# Patient Record
Sex: MALE | Race: WHITE | NOT HISPANIC OR LATINO | URBAN - METROPOLITAN AREA
[De-identification: names, ages, dates, MRNs, and addresses within clinical notes are randomized per-mention and may not be internally consistent; named-entity substitution may affect disease eponyms.]

---

## 2017-09-03 ENCOUNTER — INPATIENT (INPATIENT)
Facility: HOSPITAL | Age: 77
LOS: 0 days | Discharge: HOME | DRG: 694 | End: 2017-09-04
Attending: UROLOGY | Admitting: UROLOGY
Payer: MEDICARE

## 2017-09-03 VITALS
HEART RATE: 65 BPM | OXYGEN SATURATION: 97 % | HEIGHT: 71 IN | SYSTOLIC BLOOD PRESSURE: 179 MMHG | RESPIRATION RATE: 18 BRPM | DIASTOLIC BLOOD PRESSURE: 82 MMHG | TEMPERATURE: 98 F | WEIGHT: 190.04 LBS

## 2017-09-03 DIAGNOSIS — Z85.828 PERSONAL HISTORY OF OTHER MALIGNANT NEOPLASM OF SKIN: Chronic | ICD-10-CM

## 2017-09-03 DIAGNOSIS — Z90.89 ACQUIRED ABSENCE OF OTHER ORGANS: Chronic | ICD-10-CM

## 2017-09-03 LAB
ALBUMIN SERPL ELPH-MCNC: 4.9 G/DL — SIGNIFICANT CHANGE UP (ref 3.3–5)
ALP SERPL-CCNC: 65 U/L — SIGNIFICANT CHANGE UP (ref 40–120)
ALT FLD-CCNC: 25 U/L — SIGNIFICANT CHANGE UP (ref 10–45)
ANION GAP SERPL CALC-SCNC: 19 MMOL/L — HIGH (ref 5–17)
APPEARANCE UR: CLEAR — SIGNIFICANT CHANGE UP
APTT BLD: 27.4 SEC — LOW (ref 27.5–37.4)
AST SERPL-CCNC: 26 U/L — SIGNIFICANT CHANGE UP (ref 10–40)
BASOPHILS NFR BLD AUTO: 0.1 % — SIGNIFICANT CHANGE UP (ref 0–2)
BILIRUB SERPL-MCNC: 0.7 MG/DL — SIGNIFICANT CHANGE UP (ref 0.2–1.2)
BILIRUB UR-MCNC: NEGATIVE — SIGNIFICANT CHANGE UP
BUN SERPL-MCNC: 26 MG/DL — HIGH (ref 7–23)
CALCIUM SERPL-MCNC: 9.8 MG/DL — SIGNIFICANT CHANGE UP (ref 8.4–10.5)
CHLORIDE SERPL-SCNC: 93 MMOL/L — LOW (ref 96–108)
CO2 SERPL-SCNC: 25 MMOL/L — SIGNIFICANT CHANGE UP (ref 22–31)
COLOR SPEC: YELLOW — SIGNIFICANT CHANGE UP
CREAT SERPL-MCNC: 1 MG/DL — SIGNIFICANT CHANGE UP (ref 0.5–1.3)
DIFF PNL FLD: (no result)
EOSINOPHIL NFR BLD AUTO: 0.1 % — SIGNIFICANT CHANGE UP (ref 0–6)
GLUCOSE SERPL-MCNC: 262 MG/DL — HIGH (ref 70–99)
GLUCOSE UR QL: 500
HCT VFR BLD CALC: 41.8 % — SIGNIFICANT CHANGE UP (ref 39–50)
HGB BLD-MCNC: 14.5 G/DL — SIGNIFICANT CHANGE UP (ref 13–17)
INR BLD: 1.01 — SIGNIFICANT CHANGE UP (ref 0.88–1.16)
KETONES UR-MCNC: 15 MG/DL
LEUKOCYTE ESTERASE UR-ACNC: NEGATIVE — SIGNIFICANT CHANGE UP
LIDOCAIN IGE QN: 25 U/L — SIGNIFICANT CHANGE UP (ref 7–60)
LYMPHOCYTES # BLD AUTO: 6.4 % — LOW (ref 13–44)
MCHC RBC-ENTMCNC: 29.6 PG — SIGNIFICANT CHANGE UP (ref 27–34)
MCHC RBC-ENTMCNC: 34.7 G/DL — SIGNIFICANT CHANGE UP (ref 32–36)
MCV RBC AUTO: 85.3 FL — SIGNIFICANT CHANGE UP (ref 80–100)
MONOCYTES NFR BLD AUTO: 3.2 % — SIGNIFICANT CHANGE UP (ref 2–14)
NEUTROPHILS NFR BLD AUTO: 90.2 % — HIGH (ref 43–77)
NITRITE UR-MCNC: NEGATIVE — SIGNIFICANT CHANGE UP
PH UR: 5.5 — SIGNIFICANT CHANGE UP (ref 5–8)
PLATELET # BLD AUTO: 232 K/UL — SIGNIFICANT CHANGE UP (ref 150–400)
POTASSIUM SERPL-MCNC: 4.1 MMOL/L — SIGNIFICANT CHANGE UP (ref 3.5–5.3)
POTASSIUM SERPL-SCNC: 4.1 MMOL/L — SIGNIFICANT CHANGE UP (ref 3.5–5.3)
PROT SERPL-MCNC: 8.4 G/DL — HIGH (ref 6–8.3)
PROT UR-MCNC: 100 MG/DL
PROTHROM AB SERPL-ACNC: 11.2 SEC — SIGNIFICANT CHANGE UP (ref 9.8–12.7)
RBC # BLD: 4.9 M/UL — SIGNIFICANT CHANGE UP (ref 4.2–5.8)
RBC # FLD: 13.4 % — SIGNIFICANT CHANGE UP (ref 10.3–16.9)
SODIUM SERPL-SCNC: 137 MMOL/L — SIGNIFICANT CHANGE UP (ref 135–145)
SP GR SPEC: 1.02 — SIGNIFICANT CHANGE UP (ref 1–1.03)
UROBILINOGEN FLD QL: 0.2 E.U./DL — SIGNIFICANT CHANGE UP
WBC # BLD: 10.5 K/UL — SIGNIFICANT CHANGE UP (ref 3.8–10.5)
WBC # FLD AUTO: 10.5 K/UL — SIGNIFICANT CHANGE UP (ref 3.8–10.5)

## 2017-09-03 PROCEDURE — 71010: CPT | Mod: 26

## 2017-09-03 PROCEDURE — 99285 EMERGENCY DEPT VISIT HI MDM: CPT

## 2017-09-03 PROCEDURE — 74176 CT ABD & PELVIS W/O CONTRAST: CPT | Mod: 26

## 2017-09-03 RX ORDER — ONDANSETRON 8 MG/1
4 TABLET, FILM COATED ORAL EVERY 6 HOURS
Qty: 0 | Refills: 0 | Status: DISCONTINUED | OUTPATIENT
Start: 2017-09-03 | End: 2017-09-04

## 2017-09-03 RX ORDER — SODIUM CHLORIDE 9 MG/ML
1000 INJECTION INTRAMUSCULAR; INTRAVENOUS; SUBCUTANEOUS
Qty: 0 | Refills: 0 | Status: DISCONTINUED | OUTPATIENT
Start: 2017-09-03 | End: 2017-09-04

## 2017-09-03 RX ORDER — ACETAMINOPHEN 500 MG
650 TABLET ORAL EVERY 6 HOURS
Qty: 0 | Refills: 0 | Status: DISCONTINUED | OUTPATIENT
Start: 2017-09-03 | End: 2017-09-04

## 2017-09-03 RX ORDER — KETOROLAC TROMETHAMINE 30 MG/ML
15 SYRINGE (ML) INJECTION EVERY 6 HOURS
Qty: 0 | Refills: 0 | Status: DISCONTINUED | OUTPATIENT
Start: 2017-09-03 | End: 2017-09-04

## 2017-09-03 RX ORDER — HEPARIN SODIUM 5000 [USP'U]/ML
5000 INJECTION INTRAVENOUS; SUBCUTANEOUS EVERY 8 HOURS
Qty: 0 | Refills: 0 | Status: DISCONTINUED | OUTPATIENT
Start: 2017-09-03 | End: 2017-09-03

## 2017-09-03 RX ORDER — HYDROMORPHONE HYDROCHLORIDE 2 MG/ML
1 INJECTION INTRAMUSCULAR; INTRAVENOUS; SUBCUTANEOUS ONCE
Qty: 0 | Refills: 0 | Status: DISCONTINUED | OUTPATIENT
Start: 2017-09-03 | End: 2017-09-03

## 2017-09-03 RX ORDER — ATORVASTATIN CALCIUM 80 MG/1
10 TABLET, FILM COATED ORAL AT BEDTIME
Qty: 0 | Refills: 0 | Status: DISCONTINUED | OUTPATIENT
Start: 2017-09-03 | End: 2017-09-04

## 2017-09-03 RX ORDER — KETOROLAC TROMETHAMINE 30 MG/ML
30 SYRINGE (ML) INJECTION ONCE
Qty: 0 | Refills: 0 | Status: DISCONTINUED | OUTPATIENT
Start: 2017-09-03 | End: 2017-09-03

## 2017-09-03 RX ORDER — MORPHINE SULFATE 50 MG/1
4 CAPSULE, EXTENDED RELEASE ORAL ONCE
Qty: 0 | Refills: 0 | Status: DISCONTINUED | OUTPATIENT
Start: 2017-09-03 | End: 2017-09-03

## 2017-09-03 RX ORDER — AMLODIPINE BESYLATE 2.5 MG/1
5 TABLET ORAL
Qty: 0 | Refills: 0 | Status: DISCONTINUED | OUTPATIENT
Start: 2017-09-03 | End: 2017-09-04

## 2017-09-03 RX ORDER — ONDANSETRON 8 MG/1
4 TABLET, FILM COATED ORAL ONCE
Qty: 0 | Refills: 0 | Status: COMPLETED | OUTPATIENT
Start: 2017-09-03 | End: 2017-09-03

## 2017-09-03 RX ORDER — HYDROMORPHONE HYDROCHLORIDE 2 MG/ML
0.5 INJECTION INTRAMUSCULAR; INTRAVENOUS; SUBCUTANEOUS EVERY 6 HOURS
Qty: 0 | Refills: 0 | Status: DISCONTINUED | OUTPATIENT
Start: 2017-09-03 | End: 2017-09-04

## 2017-09-03 RX ORDER — SODIUM CHLORIDE 9 MG/ML
1000 INJECTION INTRAMUSCULAR; INTRAVENOUS; SUBCUTANEOUS ONCE
Qty: 0 | Refills: 0 | Status: COMPLETED | OUTPATIENT
Start: 2017-09-03 | End: 2017-09-03

## 2017-09-03 RX ADMIN — MORPHINE SULFATE 4 MILLIGRAM(S): 50 CAPSULE, EXTENDED RELEASE ORAL at 13:35

## 2017-09-03 RX ADMIN — HYDROMORPHONE HYDROCHLORIDE 1 MILLIGRAM(S): 2 INJECTION INTRAMUSCULAR; INTRAVENOUS; SUBCUTANEOUS at 13:50

## 2017-09-03 RX ADMIN — SODIUM CHLORIDE 1000 MILLILITER(S): 9 INJECTION INTRAMUSCULAR; INTRAVENOUS; SUBCUTANEOUS at 13:11

## 2017-09-03 RX ADMIN — HYDROMORPHONE HYDROCHLORIDE 1 MILLIGRAM(S): 2 INJECTION INTRAMUSCULAR; INTRAVENOUS; SUBCUTANEOUS at 17:20

## 2017-09-03 RX ADMIN — MORPHINE SULFATE 4 MILLIGRAM(S): 50 CAPSULE, EXTENDED RELEASE ORAL at 13:13

## 2017-09-03 RX ADMIN — Medication 30 MILLIGRAM(S): at 18:05

## 2017-09-03 RX ADMIN — ONDANSETRON 4 MILLIGRAM(S): 8 TABLET, FILM COATED ORAL at 13:48

## 2017-09-03 RX ADMIN — AMLODIPINE BESYLATE 5 MILLIGRAM(S): 2.5 TABLET ORAL at 21:26

## 2017-09-03 RX ADMIN — SODIUM CHLORIDE 120 MILLILITER(S): 9 INJECTION INTRAMUSCULAR; INTRAVENOUS; SUBCUTANEOUS at 18:35

## 2017-09-03 RX ADMIN — HYDROMORPHONE HYDROCHLORIDE 1 MILLIGRAM(S): 2 INJECTION INTRAMUSCULAR; INTRAVENOUS; SUBCUTANEOUS at 15:59

## 2017-09-03 RX ADMIN — Medication 30 MILLIGRAM(S): at 17:38

## 2017-09-03 RX ADMIN — ATORVASTATIN CALCIUM 10 MILLIGRAM(S): 80 TABLET, FILM COATED ORAL at 21:26

## 2017-09-03 RX ADMIN — ONDANSETRON 4 MILLIGRAM(S): 8 TABLET, FILM COATED ORAL at 13:11

## 2017-09-03 RX ADMIN — HYDROMORPHONE HYDROCHLORIDE 1 MILLIGRAM(S): 2 INJECTION INTRAMUSCULAR; INTRAVENOUS; SUBCUTANEOUS at 14:05

## 2017-09-03 NOTE — ED ADULT NURSE REASSESSMENT NOTE - NS ED NURSE REASSESS COMMENT FT1
Patient is A&O x3 and +PERRLA. Patient is NAD, speaking full sentences, chest rise is equal and symmetrical bilaterally, trachea midline and lung sounds are clear in upper/lower bases bilaterally. Abdomen is soft, tender in RLQ upon palpation, +bowel sounds in all 4 quadrants, no pulsatile mass noted. No edema noted in lower extremities bilaterally, cap refill less than 3 seconds bilaterally, dorsal pedal pulses are good bilaterally. Patient resting with wife at bedside and aware with plan of care, will continue to monitor.

## 2017-09-03 NOTE — ED ADULT NURSE REASSESSMENT NOTE - NS ED NURSE REASSESS COMMENT FT1
Patient back from CT and awaiting results, patient aware with plan of care, will continue to monitor.

## 2017-09-03 NOTE — H&P ADULT - HISTORY OF PRESENT ILLNESS
76 year old male with a history of HTN, pre diabetes, HLD and a kidney stone (14 years prior) who presents with a two day history of right sided abdominal pain that radiates to his back associated with nausea / vomiting / anorexia but no fevers. The patient denies any difficulty urinating or dysuria.     In the ED the patient was afebrile with a normal white count. CT scan demonstrated an obstructing 5 mm stone located at the UV junction. The patient's pain was not controlled adequately on an aggressive course of antibiotics and he denies being able to tolerate anything PO. The patient was admitted for adequate pain control. 76 year old male with a history of HTN, pre diabetes, HLD and a kidney stone (14 years prior) who presents with a two day history of right sided abdominal pain that radiates to his back associated with nausea / vomiting / anorexia but no fevers. The patient denies any difficulty urinating or dysuria.     In the ED the patient was afebrile with a normal white count. CT scan demonstrated an obstructing 5 mm stone located at the R UV junction. The patient's pain was not controlled adequately on an aggressive course of antibiotics and he denies being able to tolerate anything PO. The patient was admitted for adequate pain control.

## 2017-09-03 NOTE — ED ADULT NURSE NOTE - CHPI ED SYMPTOMS NEG
no dysuria/no burning urination/no abdominal distension/no chills/no diarrhea/no fever/no hematuria/CP, SOB, dizziness, leg swelling./no blood in stool

## 2017-09-03 NOTE — ED ADULT NURSE NOTE - OBJECTIVE STATEMENT
Patient comes in via EMS into bed #14 complaining of nausea, dry heaving, constant sharp 8/10 RLQ abdominal pain that radiates to right flank that started today. No prior tx. Hx of Kidney stones. Last BM today 10AM which was normal.

## 2017-09-03 NOTE — H&P ADULT - ASSESSMENT
76 year old male with a history of HTN, pre diabetes, HLD and a kidney stone (14 years prior) who presents with a 5 mm kidney stone located at the UV junction  - pain control  - DVT ppx   - IVF   - Reg diet   - NPOaMN   - no need for abx   - HTN meds   - ISS   - OOB / SCDs   - EKG   - CXR   - AM labs 76 year old male with a history of HTN, pre diabetes, HLD and a kidney stone (14 years prior) who presents with a 5 mm kidney stone located at the R UV junction  - pain control  - DVT ppx   - IVF   - Reg diet   - NPOaMN   - no need for abx   - HTN meds   - ISS   - OOB / SCDs   - EKG   - CXR   - AM labs

## 2017-09-03 NOTE — ED PROVIDER NOTE - MEDICAL DECISION MAKING DETAILS
77 yo male with right sided abd/flank pain and vomiting for 1 day.  hx of kidney stones.  will get labs, ct abd ro stone, iv fluids and pain meds

## 2017-09-03 NOTE — H&P ADULT - NSHPPHYSICALEXAM_GEN_ALL_CORE
Gen: NAD   HEENT: Normocephalic, atraumatic   Resp: CTA BL   CV: RRR, no murmurs or gallops   Abd: soft, mildly ttp over right chema abdomen   : No flank tenderness

## 2017-09-03 NOTE — ED PROVIDER NOTE - OBJECTIVE STATEMENT
right sided abd pain started last night and then got better  today pain started again and radiates to right back, vomited  no fever  hx of kidney stone 12 years ago

## 2017-09-03 NOTE — ED ADULT NURSE REASSESSMENT NOTE - NS ED NURSE REASSESS COMMENT FT1
Lab results reviewed, patient awaiting to go to CT and aware with plan of care, will continue to monitor.

## 2017-09-04 ENCOUNTER — TRANSCRIPTION ENCOUNTER (OUTPATIENT)
Age: 77
End: 2017-09-04

## 2017-09-04 VITALS
OXYGEN SATURATION: 96 % | HEART RATE: 93 BPM | SYSTOLIC BLOOD PRESSURE: 121 MMHG | RESPIRATION RATE: 17 BRPM | TEMPERATURE: 99 F | DIASTOLIC BLOOD PRESSURE: 73 MMHG

## 2017-09-04 DIAGNOSIS — N20.1 CALCULUS OF URETER: ICD-10-CM

## 2017-09-04 LAB
ANION GAP SERPL CALC-SCNC: 11 MMOL/L — SIGNIFICANT CHANGE UP (ref 5–17)
ANION GAP SERPL CALC-SCNC: 13 MMOL/L — SIGNIFICANT CHANGE UP (ref 5–17)
APTT BLD: 25.2 SEC — LOW (ref 27.5–37.4)
BLD GP AB SCN SERPL QL: NEGATIVE — SIGNIFICANT CHANGE UP
BUN SERPL-MCNC: 22 MG/DL — SIGNIFICANT CHANGE UP (ref 7–23)
BUN SERPL-MCNC: 23 MG/DL — SIGNIFICANT CHANGE UP (ref 7–23)
CALCIUM SERPL-MCNC: 8.3 MG/DL — LOW (ref 8.4–10.5)
CALCIUM SERPL-MCNC: 8.7 MG/DL — SIGNIFICANT CHANGE UP (ref 8.4–10.5)
CHLORIDE SERPL-SCNC: 101 MMOL/L — SIGNIFICANT CHANGE UP (ref 96–108)
CHLORIDE SERPL-SCNC: 99 MMOL/L — SIGNIFICANT CHANGE UP (ref 96–108)
CO2 SERPL-SCNC: 26 MMOL/L — SIGNIFICANT CHANGE UP (ref 22–31)
CO2 SERPL-SCNC: 28 MMOL/L — SIGNIFICANT CHANGE UP (ref 22–31)
CREAT SERPL-MCNC: 1.3 MG/DL — SIGNIFICANT CHANGE UP (ref 0.5–1.3)
CREAT SERPL-MCNC: 1.4 MG/DL — HIGH (ref 0.5–1.3)
GLUCOSE SERPL-MCNC: 162 MG/DL — HIGH (ref 70–99)
GLUCOSE SERPL-MCNC: 177 MG/DL — HIGH (ref 70–99)
HCT VFR BLD CALC: 37 % — LOW (ref 39–50)
HGB BLD-MCNC: 12.2 G/DL — LOW (ref 13–17)
INR BLD: 1.11 — SIGNIFICANT CHANGE UP (ref 0.88–1.16)
MAGNESIUM SERPL-MCNC: 1.8 MG/DL — SIGNIFICANT CHANGE UP (ref 1.6–2.6)
MCHC RBC-ENTMCNC: 28.5 PG — SIGNIFICANT CHANGE UP (ref 27–34)
MCHC RBC-ENTMCNC: 33 G/DL — SIGNIFICANT CHANGE UP (ref 32–36)
MCV RBC AUTO: 86.4 FL — SIGNIFICANT CHANGE UP (ref 80–100)
PHOSPHATE SERPL-MCNC: 3.2 MG/DL — SIGNIFICANT CHANGE UP (ref 2.5–4.5)
PLATELET # BLD AUTO: 207 K/UL — SIGNIFICANT CHANGE UP (ref 150–400)
POTASSIUM SERPL-MCNC: 3.4 MMOL/L — LOW (ref 3.5–5.3)
POTASSIUM SERPL-MCNC: 4.1 MMOL/L — SIGNIFICANT CHANGE UP (ref 3.5–5.3)
POTASSIUM SERPL-SCNC: 3.4 MMOL/L — LOW (ref 3.5–5.3)
POTASSIUM SERPL-SCNC: 4.1 MMOL/L — SIGNIFICANT CHANGE UP (ref 3.5–5.3)
PROTHROM AB SERPL-ACNC: 12.4 SEC — SIGNIFICANT CHANGE UP (ref 9.8–12.7)
RBC # BLD: 4.28 M/UL — SIGNIFICANT CHANGE UP (ref 4.2–5.8)
RBC # FLD: 13.2 % — SIGNIFICANT CHANGE UP (ref 10.3–16.9)
RH IG SCN BLD-IMP: POSITIVE — SIGNIFICANT CHANGE UP
SODIUM SERPL-SCNC: 138 MMOL/L — SIGNIFICANT CHANGE UP (ref 135–145)
SODIUM SERPL-SCNC: 140 MMOL/L — SIGNIFICANT CHANGE UP (ref 135–145)
WBC # BLD: 8.8 K/UL — SIGNIFICANT CHANGE UP (ref 3.8–10.5)
WBC # FLD AUTO: 8.8 K/UL — SIGNIFICANT CHANGE UP (ref 3.8–10.5)

## 2017-09-04 RX ORDER — TAMSULOSIN HYDROCHLORIDE 0.4 MG/1
0.4 CAPSULE ORAL DAILY
Qty: 0 | Refills: 0 | Status: DISCONTINUED | OUTPATIENT
Start: 2017-09-04 | End: 2017-09-04

## 2017-09-04 RX ORDER — PHENAZOPYRIDINE HCL 100 MG
2 TABLET ORAL
Qty: 12 | Refills: 0
Start: 2017-09-04 | End: 2017-09-06

## 2017-09-04 RX ORDER — SODIUM CHLORIDE 9 MG/ML
1000 INJECTION, SOLUTION INTRAVENOUS
Qty: 0 | Refills: 0 | Status: DISCONTINUED | OUTPATIENT
Start: 2017-09-04 | End: 2017-09-04

## 2017-09-04 RX ORDER — INSULIN LISPRO 100/ML
VIAL (ML) SUBCUTANEOUS
Qty: 0 | Refills: 0 | Status: DISCONTINUED | OUTPATIENT
Start: 2017-09-04 | End: 2017-09-04

## 2017-09-04 RX ORDER — MAGNESIUM OXIDE 400 MG ORAL TABLET 241.3 MG
400 TABLET ORAL ONCE
Qty: 0 | Refills: 0 | Status: COMPLETED | OUTPATIENT
Start: 2017-09-04 | End: 2017-09-04

## 2017-09-04 RX ORDER — OXYCODONE AND ACETAMINOPHEN 5; 325 MG/1; MG/1
2 TABLET ORAL EVERY 4 HOURS
Qty: 0 | Refills: 0 | Status: DISCONTINUED | OUTPATIENT
Start: 2017-09-04 | End: 2017-09-04

## 2017-09-04 RX ORDER — TAMSULOSIN HYDROCHLORIDE 0.4 MG/1
1 CAPSULE ORAL
Qty: 30 | Refills: 0
Start: 2017-09-04 | End: 2017-10-04

## 2017-09-04 RX ORDER — DOCUSATE SODIUM 100 MG
1 CAPSULE ORAL
Qty: 30 | Refills: 0
Start: 2017-09-04

## 2017-09-04 RX ORDER — OXYCODONE AND ACETAMINOPHEN 5; 325 MG/1; MG/1
1 TABLET ORAL EVERY 4 HOURS
Qty: 0 | Refills: 0 | Status: DISCONTINUED | OUTPATIENT
Start: 2017-09-04 | End: 2017-09-04

## 2017-09-04 RX ADMIN — TAMSULOSIN HYDROCHLORIDE 0.4 MILLIGRAM(S): 0.4 CAPSULE ORAL at 11:37

## 2017-09-04 RX ADMIN — AMLODIPINE BESYLATE 5 MILLIGRAM(S): 2.5 TABLET ORAL at 05:57

## 2017-09-04 RX ADMIN — SODIUM CHLORIDE 120 MILLILITER(S): 9 INJECTION INTRAMUSCULAR; INTRAVENOUS; SUBCUTANEOUS at 05:57

## 2017-09-04 RX ADMIN — SODIUM CHLORIDE 120 MILLILITER(S): 9 INJECTION INTRAMUSCULAR; INTRAVENOUS; SUBCUTANEOUS at 11:38

## 2017-09-04 RX ADMIN — Medication 15 MILLIGRAM(S): at 08:35

## 2017-09-04 RX ADMIN — Medication 15 MILLIGRAM(S): at 08:22

## 2017-09-04 RX ADMIN — Medication 2: at 11:37

## 2017-09-04 RX ADMIN — MAGNESIUM OXIDE 400 MG ORAL TABLET 400 MILLIGRAM(S): 241.3 TABLET ORAL at 11:03

## 2017-09-04 NOTE — DISCHARGE NOTE ADULT - HOSPITAL COURSE
patient underwent, ivf hydration and pain control. Pain now controlled, afebrile, ambulatory and hemodynamically stable.

## 2017-09-04 NOTE — PROGRESS NOTE ADULT - SUBJECTIVE AND OBJECTIVE BOX
AM Note    No acute events overnight.      Vital Signs Last 24 Hrs  T(C): 36.7 (09-04-17 @ 05:30), Max: 37 (09-03-17 @ 18:45)  T(F): 98 (09-04-17 @ 05:30), Max: 98.6 (09-03-17 @ 18:45)  HR: 81 (09-04-17 @ 05:30) (65 - 99)  BP: 149/81 (09-04-17 @ 05:30) (138/81 - 179/82)  BP(mean): --  RR: 17 (09-04-17 @ 05:30) (16 - 18)  SpO2: 96% (09-04-17 @ 05:30) (94% - 98%)                          14.5   10.5  )-----------( 232      ( 03 Sep 2017 13:12 )             41.8        03 Sep 2017 13:12    137    |  93     |  26     ----------------------------<  262    4.1     |  25     |  1.00     Ca    9.8        03 Sep 2017 13:12    TPro  8.4    /  Alb  4.9    /  TBili  0.7    /  DBili  x      /  AST  26     /  ALT  25     /  AlkPhos  65     03 Sep 2017 13:12        I&O's Summary    03 Sep 2017 07:01  -  04 Sep 2017 06:35  --------------------------------------------------------  IN: 1420 mL / OUT: 1100 mL / NET: 320 mL          PHYSICAL EXAM:    GEN:  ABD:  SAMAN:  AM Note    No acute events overnight.      Vital Signs Last 24 Hrs  T(C): 36.7 (09-04-17 @ 05:30), Max: 37 (09-03-17 @ 18:45)  T(F): 98 (09-04-17 @ 05:30), Max: 98.6 (09-03-17 @ 18:45)  HR: 81 (09-04-17 @ 05:30) (65 - 99)  BP: 149/81 (09-04-17 @ 05:30) (138/81 - 179/82)  BP(mean): --  RR: 17 (09-04-17 @ 05:30) (16 - 18)  SpO2: 96% (09-04-17 @ 05:30) (94% - 98%)                          14.5   10.5  )-----------( 232      ( 03 Sep 2017 13:12 )             41.8        03 Sep 2017 13:12    137    |  93     |  26     ----------------------------<  262    4.1     |  25     |  1.00     Ca    9.8        03 Sep 2017 13:12    TPro  8.4    /  Alb  4.9    /  TBili  0.7    /  DBili  x      /  AST  26     /  ALT  25     /  AlkPhos  65     03 Sep 2017 13:12        I&O's Summary    03 Sep 2017 07:01  -  04 Sep 2017 06:35  --------------------------------------------------------  IN: 1420 mL / OUT: 1100 mL / NET: 320 mL          PHYSICAL EXAM:    GEN: NAD  ABD: soft, ND, NT, + minimal R CVA TTP  : voiding

## 2017-09-04 NOTE — PROGRESS NOTE ADULT - PROBLEM SELECTOR PLAN 1
1 pain controlled, no fevers nausea or vomiting overnight, can advnace diet  2 cont abx  3 cont iv fluids  4 cont strain urine  5 f/u 2 pm bmp

## 2017-09-04 NOTE — DISCHARGE NOTE ADULT - MEDICATION SUMMARY - MEDICATIONS TO TAKE
I will START or STAY ON the medications listed below when I get home from the hospital:    Percocet 5/325 325 mg-5 mg oral tablet  -- 1 tab(s) by mouth every 6 hours, As Needed for pain MDD:4  -- Caution federal law prohibits the transfer of this drug to any person other  than the person for whom it was prescribed.  May cause drowsiness.  Alcohol may intensify this effect.  Use care when operating dangerous machinery.  This prescription cannot be refilled.  This product contains acetaminophen.  Do not use  with any other product containing acetaminophen to prevent possible liver damage.  Using more of this medication than prescribed may cause serious breathing problems.    -- Indication: For as needed for pain    Atacand 16 mg oral tablet  -- 1 tab(s) by mouth once a day  -- Indication: For Htn    Flomax 0.4 mg oral capsule  -- 1 cap(s) by mouth once a day MDD:1  -- It is very important that you take or use this exactly as directed.  Do not skip doses or discontinue unless directed by your doctor.  May cause drowsiness.  Alcohol may intensify this effect.  Use care when operating dangerous machinery.  Some non-prescription drugs may aggravate your condition.  Read all labels carefully.  If a warning appears, check with your doctor before taking.  Swallow whole.  Do not crush.  Take with food or milk.    -- Indication: For to assist with stone passage    metFORMIN 500 mg oral tablet  -- 1 tab(s) by mouth 2 times a day  -- Indication: For Diabetes    atorvastatin 10 mg oral tablet  -- 1 tab(s) by mouth once a day  -- Indication: For High cholesterol    zolpidem 10 mg oral tablet  -- 1 tab(s) by mouth once a day (at bedtime)  -- Indication: For insomnia    amLODIPine 5 mg oral tablet  -- 1 tab(s) by mouth once a day  -- Indication: For Htn    Colace sodium 100 mg oral capsule  -- 1 cap(s) by mouth 2 times a day MDD:constipation  -- Medication should be taken with plenty of water.    -- Indication: For as needed for constipation    Pyridium 100 mg oral tablet  -- 2 tab(s) by mouth 3 times a day (after meals), As Needed pain with urination MDD:3  -- May discolor urine or feces.  Medication should be taken with plenty of water.  Take with food or milk.    -- Indication: For as needed for dysuria or pain with urination

## 2017-09-04 NOTE — DISCHARGE NOTE ADULT - CARE PROVIDERS DIRECT ADDRESSES
,DirectAddress_Unknown ,DirectAddress_Unknown,[email or web address],DirectAddress_Unknown,[email or web address]

## 2017-09-04 NOTE — DISCHARGE NOTE ADULT - CARE PROVIDER_API CALL
Charli Chino), Urology  [address] [place] [address], [place] [id number]  Phone: [phone]  Fax: [phone] Charli Chino), Urology  [address] [facility]  [address] [place], [place] [id number]  Phone: [phone]  Fax: [phone]    German Mazariegos), Urology  [address] [place] [address]  Phone: [phone]  Fax: [phone]    Ryan Aguilar), Internal Medicine  [address]  [place] [address]  Phone: [phone]  Fax: [phone]    Roxanna Sams), Critical Care Medicine; Internal Medicine  [address]  [place] [address]  Phone: [phone]  Fax: [phone]

## 2017-09-04 NOTE — DISCHARGE NOTE ADULT - PATIENT PORTAL LINK FT
“You can access the FollowHealth Patient Portal, offered by [facility], by registering with the following website: [email or web address]”

## 2017-09-04 NOTE — DISCHARGE NOTE ADULT - PLAN OF CARE
hydration and pain control Stay well hydrated, continue diabetic diet, you may shower. Continue to strain urine for stones. Please take all prescribed medications as directed and avoid nsaids. Please call Dr orlando with fever >100.4 questions and to set up a follow up appointment, Stay well hydrated, continue diabetic diet, you may shower. Continue to strain urine for stones. Please take all prescribed medications as directed and avoid nsaids. Please call Dr orlando with fever >100.4 questions and to set up a follow up appointment, you can also try calling Urologist Dr rae. Provided below is also the information for Primary care doctors Kar

## 2017-09-04 NOTE — DISCHARGE NOTE ADULT - PROVIDER TOKENS
TOKEN:'[id number]' TOKEN:'[id number]:'[id number]:'[id number]:'[id number]'

## 2017-09-04 NOTE — DISCHARGE NOTE ADULT - CARE PLAN
Principal Discharge DX:	Ureteral calculus, right  Goal:	hydration and pain control  Instructions for follow-up, activity and diet:	Stay well hydrated, continue diabetic diet, you may shower. Continue to strain urine for stones. Please take all prescribed medications as directed and avoid nsaids. Please call Dr orlando with fever >100.4 questions and to set up a follow up appointment, Principal Discharge DX:	Ureteral calculus, right  Goal:	hydration and pain control  Instructions for follow-up, activity and diet:	Stay well hydrated, continue diabetic diet, you may shower. Continue to strain urine for stones. Please take all prescribed medications as directed and avoid nsaids. Please call Dr orlando with fever >100.4 questions and to set up a follow up appointment, you can also try calling Urologist Dr rae. Provided below is also the information for Primary care doctors Kar

## 2017-09-07 DIAGNOSIS — Z79.84 LONG TERM (CURRENT) USE OF ORAL HYPOGLYCEMIC DRUGS: ICD-10-CM

## 2017-09-07 DIAGNOSIS — E78.5 HYPERLIPIDEMIA, UNSPECIFIED: ICD-10-CM

## 2017-09-07 DIAGNOSIS — E11.9 TYPE 2 DIABETES MELLITUS WITHOUT COMPLICATIONS: ICD-10-CM

## 2017-09-07 DIAGNOSIS — N20.1 CALCULUS OF URETER: ICD-10-CM

## 2017-09-07 DIAGNOSIS — I10 ESSENTIAL (PRIMARY) HYPERTENSION: ICD-10-CM

## 2017-10-19 PROCEDURE — 85027 COMPLETE CBC AUTOMATED: CPT

## 2017-10-19 PROCEDURE — 36415 COLL VENOUS BLD VENIPUNCTURE: CPT

## 2017-10-19 PROCEDURE — 99285 EMERGENCY DEPT VISIT HI MDM: CPT | Mod: 25

## 2017-10-19 PROCEDURE — 74176 CT ABD & PELVIS W/O CONTRAST: CPT

## 2017-10-19 PROCEDURE — 80053 COMPREHEN METABOLIC PANEL: CPT

## 2017-10-19 PROCEDURE — 96374 THER/PROPH/DIAG INJ IV PUSH: CPT

## 2017-10-19 PROCEDURE — 86900 BLOOD TYPING SEROLOGIC ABO: CPT

## 2017-10-19 PROCEDURE — 81001 URINALYSIS AUTO W/SCOPE: CPT

## 2017-10-19 PROCEDURE — 83690 ASSAY OF LIPASE: CPT

## 2017-10-19 PROCEDURE — 85025 COMPLETE CBC W/AUTO DIFF WBC: CPT

## 2017-10-19 PROCEDURE — 83735 ASSAY OF MAGNESIUM: CPT

## 2017-10-19 PROCEDURE — 71045 X-RAY EXAM CHEST 1 VIEW: CPT

## 2017-10-19 PROCEDURE — 85730 THROMBOPLASTIN TIME PARTIAL: CPT

## 2017-10-19 PROCEDURE — 96375 TX/PRO/DX INJ NEW DRUG ADDON: CPT

## 2017-10-19 PROCEDURE — 80048 BASIC METABOLIC PNL TOTAL CA: CPT

## 2017-10-19 PROCEDURE — 85610 PROTHROMBIN TIME: CPT

## 2017-10-19 PROCEDURE — 86901 BLOOD TYPING SEROLOGIC RH(D): CPT

## 2017-10-19 PROCEDURE — 84100 ASSAY OF PHOSPHORUS: CPT

## 2017-10-19 PROCEDURE — 96376 TX/PRO/DX INJ SAME DRUG ADON: CPT

## 2017-10-19 PROCEDURE — 86850 RBC ANTIBODY SCREEN: CPT

## 2019-12-17 ENCOUNTER — EMERGENCY (EMERGENCY)
Facility: HOSPITAL | Age: 79
LOS: 1 days | Discharge: HOME | End: 2019-12-17
Attending: EMERGENCY MEDICINE | Admitting: EMERGENCY MEDICINE
Payer: MEDICARE

## 2019-12-17 VITALS
RESPIRATION RATE: 18 BRPM | HEIGHT: 70 IN | OXYGEN SATURATION: 98 % | DIASTOLIC BLOOD PRESSURE: 78 MMHG | SYSTOLIC BLOOD PRESSURE: 179 MMHG | TEMPERATURE: 97 F | HEART RATE: 60 BPM | WEIGHT: 177.91 LBS

## 2019-12-17 VITALS
OXYGEN SATURATION: 95 % | TEMPERATURE: 98 F | HEART RATE: 78 BPM | RESPIRATION RATE: 14 BRPM | SYSTOLIC BLOOD PRESSURE: 147 MMHG

## 2019-12-17 DIAGNOSIS — Z90.89 ACQUIRED ABSENCE OF OTHER ORGANS: Chronic | ICD-10-CM

## 2019-12-17 DIAGNOSIS — Z85.828 PERSONAL HISTORY OF OTHER MALIGNANT NEOPLASM OF SKIN: Chronic | ICD-10-CM

## 2019-12-17 PROBLEM — I10 ESSENTIAL (PRIMARY) HYPERTENSION: Chronic | Status: ACTIVE | Noted: 2017-09-03

## 2019-12-17 PROBLEM — E78.00 PURE HYPERCHOLESTEROLEMIA, UNSPECIFIED: Chronic | Status: ACTIVE | Noted: 2017-09-03

## 2019-12-17 PROBLEM — Z87.442 PERSONAL HISTORY OF URINARY CALCULI: Chronic | Status: ACTIVE | Noted: 2017-09-03

## 2019-12-17 PROBLEM — E11.9 TYPE 2 DIABETES MELLITUS WITHOUT COMPLICATIONS: Chronic | Status: ACTIVE | Noted: 2017-09-03

## 2019-12-17 LAB
ALBUMIN SERPL ELPH-MCNC: 4.3 G/DL — SIGNIFICANT CHANGE UP (ref 3.3–5)
ALP SERPL-CCNC: 57 U/L — SIGNIFICANT CHANGE UP (ref 40–120)
ALT FLD-CCNC: 17 U/L — SIGNIFICANT CHANGE UP (ref 10–45)
ANION GAP SERPL CALC-SCNC: 12 MMOL/L — SIGNIFICANT CHANGE UP (ref 5–17)
ANION GAP SERPL CALC-SCNC: 15 MMOL/L — SIGNIFICANT CHANGE UP (ref 5–17)
APPEARANCE UR: CLEAR — SIGNIFICANT CHANGE UP
AST SERPL-CCNC: 21 U/L — SIGNIFICANT CHANGE UP (ref 10–40)
BACTERIA # UR AUTO: PRESENT /HPF
BASOPHILS # BLD AUTO: 0.03 K/UL — SIGNIFICANT CHANGE UP (ref 0–0.2)
BASOPHILS NFR BLD AUTO: 0.3 % — SIGNIFICANT CHANGE UP (ref 0–2)
BILIRUB SERPL-MCNC: 0.4 MG/DL — SIGNIFICANT CHANGE UP (ref 0.2–1.2)
BILIRUB UR-MCNC: NEGATIVE — SIGNIFICANT CHANGE UP
BUN SERPL-MCNC: 32 MG/DL — HIGH (ref 7–23)
BUN SERPL-MCNC: 35 MG/DL — HIGH (ref 7–23)
CALCIUM SERPL-MCNC: 10.2 MG/DL — SIGNIFICANT CHANGE UP (ref 8.4–10.5)
CALCIUM SERPL-MCNC: 9.1 MG/DL — SIGNIFICANT CHANGE UP (ref 8.4–10.5)
CHLORIDE SERPL-SCNC: 100 MMOL/L — SIGNIFICANT CHANGE UP (ref 96–108)
CHLORIDE SERPL-SCNC: 96 MMOL/L — SIGNIFICANT CHANGE UP (ref 96–108)
CO2 SERPL-SCNC: 26 MMOL/L — SIGNIFICANT CHANGE UP (ref 22–31)
CO2 SERPL-SCNC: 26 MMOL/L — SIGNIFICANT CHANGE UP (ref 22–31)
COLOR SPEC: YELLOW — SIGNIFICANT CHANGE UP
CREAT SERPL-MCNC: 1.47 MG/DL — HIGH (ref 0.5–1.3)
CREAT SERPL-MCNC: 1.58 MG/DL — HIGH (ref 0.5–1.3)
DIFF PNL FLD: ABNORMAL
EOSINOPHIL # BLD AUTO: 0.02 K/UL — SIGNIFICANT CHANGE UP (ref 0–0.5)
EOSINOPHIL NFR BLD AUTO: 0.2 % — SIGNIFICANT CHANGE UP (ref 0–6)
EPI CELLS # UR: SIGNIFICANT CHANGE UP /HPF (ref 0–5)
GLUCOSE SERPL-MCNC: 155 MG/DL — HIGH (ref 70–99)
GLUCOSE SERPL-MCNC: 236 MG/DL — HIGH (ref 70–99)
GLUCOSE UR QL: NEGATIVE — SIGNIFICANT CHANGE UP
HCT VFR BLD CALC: 37.7 % — LOW (ref 39–50)
HGB BLD-MCNC: 12.5 G/DL — LOW (ref 13–17)
IMM GRANULOCYTES NFR BLD AUTO: 0.6 % — SIGNIFICANT CHANGE UP (ref 0–1.5)
KETONES UR-MCNC: 15 MG/DL
LEUKOCYTE ESTERASE UR-ACNC: NEGATIVE — SIGNIFICANT CHANGE UP
LYMPHOCYTES # BLD AUTO: 0.52 K/UL — LOW (ref 1–3.3)
LYMPHOCYTES # BLD AUTO: 4.8 % — LOW (ref 13–44)
MCHC RBC-ENTMCNC: 28.9 PG — SIGNIFICANT CHANGE UP (ref 27–34)
MCHC RBC-ENTMCNC: 33.2 GM/DL — SIGNIFICANT CHANGE UP (ref 32–36)
MCV RBC AUTO: 87.1 FL — SIGNIFICANT CHANGE UP (ref 80–100)
MONOCYTES # BLD AUTO: 0.58 K/UL — SIGNIFICANT CHANGE UP (ref 0–0.9)
MONOCYTES NFR BLD AUTO: 5.3 % — SIGNIFICANT CHANGE UP (ref 2–14)
NEUTROPHILS # BLD AUTO: 9.67 K/UL — HIGH (ref 1.8–7.4)
NEUTROPHILS NFR BLD AUTO: 88.8 % — HIGH (ref 43–77)
NITRITE UR-MCNC: NEGATIVE — SIGNIFICANT CHANGE UP
NRBC # BLD: 0 /100 WBCS — SIGNIFICANT CHANGE UP (ref 0–0)
PH UR: 5.5 — SIGNIFICANT CHANGE UP (ref 5–8)
PLATELET # BLD AUTO: 197 K/UL — SIGNIFICANT CHANGE UP (ref 150–400)
POTASSIUM SERPL-MCNC: 4.2 MMOL/L — SIGNIFICANT CHANGE UP (ref 3.5–5.3)
POTASSIUM SERPL-MCNC: 4.5 MMOL/L — SIGNIFICANT CHANGE UP (ref 3.5–5.3)
POTASSIUM SERPL-SCNC: 4.2 MMOL/L — SIGNIFICANT CHANGE UP (ref 3.5–5.3)
POTASSIUM SERPL-SCNC: 4.5 MMOL/L — SIGNIFICANT CHANGE UP (ref 3.5–5.3)
PROT SERPL-MCNC: 7.1 G/DL — SIGNIFICANT CHANGE UP (ref 6–8.3)
PROT UR-MCNC: 100 MG/DL
RBC # BLD: 4.33 M/UL — SIGNIFICANT CHANGE UP (ref 4.2–5.8)
RBC # FLD: 14 % — SIGNIFICANT CHANGE UP (ref 10.3–14.5)
RBC CASTS # UR COMP ASSIST: > 10 /HPF
SODIUM SERPL-SCNC: 137 MMOL/L — SIGNIFICANT CHANGE UP (ref 135–145)
SODIUM SERPL-SCNC: 138 MMOL/L — SIGNIFICANT CHANGE UP (ref 135–145)
SP GR SPEC: >=1.03 — SIGNIFICANT CHANGE UP (ref 1–1.03)
UROBILINOGEN FLD QL: 0.2 E.U./DL — SIGNIFICANT CHANGE UP
WBC # BLD: 10.88 K/UL — HIGH (ref 3.8–10.5)
WBC # FLD AUTO: 10.88 K/UL — HIGH (ref 3.8–10.5)
WBC UR QL: < 5 /HPF — SIGNIFICANT CHANGE UP

## 2019-12-17 PROCEDURE — 85025 COMPLETE CBC W/AUTO DIFF WBC: CPT

## 2019-12-17 PROCEDURE — 80053 COMPREHEN METABOLIC PANEL: CPT

## 2019-12-17 PROCEDURE — 74176 CT ABD & PELVIS W/O CONTRAST: CPT | Mod: 26

## 2019-12-17 PROCEDURE — 74176 CT ABD & PELVIS W/O CONTRAST: CPT

## 2019-12-17 PROCEDURE — 81001 URINALYSIS AUTO W/SCOPE: CPT

## 2019-12-17 PROCEDURE — 96375 TX/PRO/DX INJ NEW DRUG ADDON: CPT

## 2019-12-17 PROCEDURE — 83690 ASSAY OF LIPASE: CPT

## 2019-12-17 PROCEDURE — 99284 EMERGENCY DEPT VISIT MOD MDM: CPT

## 2019-12-17 PROCEDURE — 96374 THER/PROPH/DIAG INJ IV PUSH: CPT

## 2019-12-17 PROCEDURE — 87086 URINE CULTURE/COLONY COUNT: CPT

## 2019-12-17 PROCEDURE — 80048 BASIC METABOLIC PNL TOTAL CA: CPT

## 2019-12-17 PROCEDURE — 36415 COLL VENOUS BLD VENIPUNCTURE: CPT

## 2019-12-17 PROCEDURE — 99284 EMERGENCY DEPT VISIT MOD MDM: CPT | Mod: 25

## 2019-12-17 RX ORDER — ZOLPIDEM TARTRATE 10 MG/1
1 TABLET ORAL
Qty: 0 | Refills: 0 | DISCHARGE

## 2019-12-17 RX ORDER — ASPIRIN/CALCIUM CARB/MAGNESIUM 324 MG
1 TABLET ORAL
Qty: 0 | Refills: 0 | DISCHARGE

## 2019-12-17 RX ORDER — SODIUM CHLORIDE 9 MG/ML
1000 INJECTION INTRAMUSCULAR; INTRAVENOUS; SUBCUTANEOUS ONCE
Refills: 0 | Status: COMPLETED | OUTPATIENT
Start: 2019-12-17 | End: 2019-12-17

## 2019-12-17 RX ORDER — CANDESARTAN CILEXETIL 8 MG/1
1 TABLET ORAL
Qty: 0 | Refills: 0 | DISCHARGE

## 2019-12-17 RX ORDER — AMLODIPINE BESYLATE 2.5 MG/1
1 TABLET ORAL
Qty: 0 | Refills: 0 | DISCHARGE

## 2019-12-17 RX ORDER — IBUPROFEN 200 MG
1 TABLET ORAL
Qty: 30 | Refills: 0
Start: 2019-12-17

## 2019-12-17 RX ORDER — ONDANSETRON 8 MG/1
4 TABLET, FILM COATED ORAL ONCE
Refills: 0 | Status: COMPLETED | OUTPATIENT
Start: 2019-12-17 | End: 2019-12-17

## 2019-12-17 RX ORDER — SITAGLIPTIN 50 MG/1
1 TABLET, FILM COATED ORAL
Qty: 0 | Refills: 0 | DISCHARGE

## 2019-12-17 RX ORDER — ATORVASTATIN CALCIUM 80 MG/1
1 TABLET, FILM COATED ORAL
Qty: 0 | Refills: 0 | DISCHARGE

## 2019-12-17 RX ORDER — TAMSULOSIN HYDROCHLORIDE 0.4 MG/1
1 CAPSULE ORAL
Qty: 7 | Refills: 0
Start: 2019-12-17 | End: 2019-12-23

## 2019-12-17 RX ORDER — METFORMIN HYDROCHLORIDE 850 MG/1
1 TABLET ORAL
Qty: 0 | Refills: 0 | DISCHARGE

## 2019-12-17 RX ORDER — KETOROLAC TROMETHAMINE 30 MG/ML
30 SYRINGE (ML) INJECTION ONCE
Refills: 0 | Status: DISCONTINUED | OUTPATIENT
Start: 2019-12-17 | End: 2019-12-17

## 2019-12-17 RX ORDER — METOPROLOL TARTRATE 50 MG
0 TABLET ORAL
Qty: 0 | Refills: 0 | DISCHARGE

## 2019-12-17 RX ADMIN — Medication 30 MILLIGRAM(S): at 09:29

## 2019-12-17 RX ADMIN — SODIUM CHLORIDE 1000 MILLILITER(S): 9 INJECTION INTRAMUSCULAR; INTRAVENOUS; SUBCUTANEOUS at 13:03

## 2019-12-17 RX ADMIN — SODIUM CHLORIDE 1000 MILLILITER(S): 9 INJECTION INTRAMUSCULAR; INTRAVENOUS; SUBCUTANEOUS at 08:51

## 2019-12-17 RX ADMIN — ONDANSETRON 4 MILLIGRAM(S): 8 TABLET, FILM COATED ORAL at 08:50

## 2019-12-17 NOTE — ED PROVIDER NOTE - PROGRESS NOTE DETAILS
CT reviewed, showing 2mm stone with hydro. Creatinine elevated from 1.3 to 1.58 as of Sept 2017. Will consult . Spoke to Urology, who are recommending rescanning pt prone to evaluate if stone is impacted. Will repeat Creatinine and BMP. Urology team will come to see pt. Spoke to Urology, who are recommending rescanning pt in prone position to evaluate if stone is impacted. Will repeat ct and BMP s/p hydration to reassess cr. Urology team will come to see pt. Repeat ct w mobile stone, cr improving.  Pt eval by geovani and cleared for dc to fu Dr Darby. Pt feeling much better.  CT reviewed, + 2mm stone in bladder but at uvj junction with hydro. Creatinine elevated from 1.3 to 1.58 as of Sept 2017. ? impacted stone.  Will consult . Repeat ct w + mobile stone, cr improving.  Pt eval by geovani and cleared for dc to fu Dr Bullard.

## 2019-12-17 NOTE — ED PROVIDER NOTE - NSFOLLOWUPINSTRUCTIONS_ED_ALL_ED_FT
Kidney stone    Drink plenty of fluids.  Take ibuprofen 1 tab every 6 hours with food as needed for pain.  Add percocet 1 tab every 6 hours as needed for severe pain.  Strain your urine until you pass your stone.  Return for increased pain, fever, inability to void, any other concerns.  Follow up with your pmd and Dr Darby.      Kidney Stones    Kidney stones (urolithiasis) are crystal deposits that form inside your kidneys. Pain is caused by the stone moving through the urinary tract, causing spasms of the ureter. Drink enough water and fluids to keep your urine clear or pale yellow. This will help you to pass the stone or stone fragments. If provided a strainer, strain all urine and keep all particulate matter and stones for a follow up appointment with a urologist.    SEEK IMMEDIATE MEDICAL CARE IF YOU HAVE ANY OF THE FOLLOWING SYMPTOMS: pain not controlled with medication, fever/chills, worsening vomiting, inability to urinate, or dizziness/lightheadedness. Kidney stone    Drink plenty of fluids.  Take ibuprofen 1 tab every 6 hours with food as needed for pain.  Add percocet 1 tab every 6 hours as needed for severe pain.  Strain your urine until you pass your stone.  Return for increased pain, fever, inability to void, any other concerns.  Follow up with your pmd and Dr Bullard.      Kidney Stones    Kidney stones (urolithiasis) are crystal deposits that form inside your kidneys. Pain is caused by the stone moving through the urinary tract, causing spasms of the ureter. Drink enough water and fluids to keep your urine clear or pale yellow. This will help you to pass the stone or stone fragments. If provided a strainer, strain all urine and keep all particulate matter and stones for a follow up appointment with a urologist.    SEEK IMMEDIATE MEDICAL CARE IF YOU HAVE ANY OF THE FOLLOWING SYMPTOMS: pain not controlled with medication, fever/chills, worsening vomiting, inability to urinate, or dizziness/lightheadedness.

## 2019-12-17 NOTE — ED PROVIDER NOTE - OBJECTIVE STATEMENT
78 y/o M with PMHx of insomnia, HTN, HLD, DM and kidney stones w/o surgical intervention needed presenting to the ED with complaints of right-sided abdominal pain, radiating to his right back with associated burning with urination. Pt reports that he woke up to this pain at 4AM today, and initially tried to drink some tea and water. However, pt began experiencing nausea and experienced 1 episode of emesis. Upon urinating, pt noted a burning sensation but denies any hematuria or dysuria. Currently in the ED, pt notes pain to be constant, 8/10 and feels similar to his prior kidney stones, but reports improvement to his nausea. Pt has not yet taken anything for pain. Denies any fever, bloody stool, chest pain, SOB, palpitations, cough, cold symptoms or headaches. Of note, pt endorses experiencing indigestion recently over the past few days, but denies any hx of gallstone or liver issues. 78 y/o M with PMHx of insomnia, HTN, HLD, DM and kidney stones w/o surgical intervention needed presenting to the ED with complaints of right-sided flank and abdominal pain, radiating to his right back with associated burning with urination. Pt reports that he woke up to this pain at 4AM today, and initially tried to drink some tea and water. However, pt began experiencing nausea and experienced 1 episode of emesis. Upon urinating, pt noted a burning sensation but denies any hematuria or dysuria. Currently in the ED, pt notes pain to be constant, 8/10 and feels similar to his prior kidney stones, but reports improvement to his nausea. Pt has not yet taken anything for pain. Denies any fever, bloody stool, chest pain, SOB, palpitations, cough, cold symptoms or headaches. Of note, pt endorses experiencing indigestion recently over the past few days, but denies any hx of gallstone or liver issues.

## 2019-12-17 NOTE — ED PROVIDER NOTE - CLINICAL SUMMARY MEDICAL DECISION MAKING FREE TEXT BOX
80 y/o presenting with symptoms concerning for kidney stone based on pt's hx of stones with similar pain. On exam, pain to RUQ so suspicion for possible gallstones or cholecystitis given pt's recent indigestion as well. Labs ordered; UA reviewed with positive blood, negative for UTI. Will start with CT for stone which should also show gallbladder. Pt given IV fluids, pain meds, and nausea medication. 78 y/o presenting with symptoms concerning for kidney stone based on pt's hx of stones with similar pain. On exam, pain to RUQ so also possible gallstones or cholecystitis given pt's recent indigestion sx as well. Labs ordered; UA reviewed with positive blood, negative for UTI. Will start with CT for stone which should also show gallbladder pathology if present. Pt given IV fluids, pain meds, and nausea medication.  Reassess.

## 2019-12-17 NOTE — ED ADULT NURSE NOTE - OBJECTIVE STATEMENT
Pt is a 80 y/o male c/o RLQ pain radiating to right flank starting this am. Pt reports burning on urination, N/V. Pt denies hematuria, diarrhea, fever, chills. Pt is a 80 y/o male c/o RLQ pain radiating to right flank starting around 4 am. Pt reports burning on urination, N/V. Pt reports pain increases on inspiration. Pt denies hematuria, diarrhea, fever, chills, SOB, chest pain. Upon assessment LCTA, negative CVA tenderness, abdomen soft and non-distended, RLQ tender to palpation. Pt is a 80 y/o male c/o RLQ pain radiating to right flank starting around 4 am. Pt reports burning on urination, N/V. Pt reports pain increases on inspiration. Pt denies hematuria, diarrhea, fever, chills, SOB, chest pain. Upon assessment LCTA, negative CVA tenderness, abdomen soft and non-distended, RLQ tender to palpation. Pt speaking in full, clear sentences. Pt reports hx of kidney stones.

## 2019-12-17 NOTE — CONSULT NOTE ADULT - SUBJECTIVE AND OBJECTIVE BOX
CONSULT NOTE:  HPI: 78 yo m known to Dr Coronado with hx kidney stones, presents to ER iwht right flank pain since 4 am associated with nausea and vomiting. Pain is severe and does ot radiate. Denies fever, chills or any LUTS.       Vital Signs Last 24 Hrs  T(C): 36.7 (17 Dec 2019 14:36), Max: 36.7 (17 Dec 2019 14:36)  T(F): 98 (17 Dec 2019 14:36), Max: 98 (17 Dec 2019 14:36)  HR: 78 (17 Dec 2019 14:36) (60 - 80)  BP: 147/- (17 Dec 2019 14:36) (147/- - 179/78)  BP(mean): --  RR: 14 (17 Dec 2019 14:36) (14 - 18)  SpO2: 95% (17 Dec 2019 14:36) (94% - 98%)  I&O's Summary      PE:  Gen: NAD  Abd: soft, ND, NT no cva TTP  : voiding      LABS:                        12.5   10.88 )-----------( 197      ( 17 Dec 2019 08:57 )             37.7     12-17    138  |  100  |  32<H>  ----------------------------<  155<H>  4.2   |  26  |  1.47<H>    Ca    9.1      17 Dec 2019 13:07    TPro  7.1  /  Alb  4.3  /  TBili  0.4  /  DBili  x   /  AST  21  /  ALT  17  /  AlkPhos  57  12-17      Cultures      A/P 78 yo m with passed right ureteral stone  1) Prone ct shows passed right ureteral stone and some resolution of hydro  2)Cr improved with hydration  3) pain free and afebrile  4) dc and f/u Dr BAER as outpt  5) discussed with gu team

## 2019-12-17 NOTE — ED PROVIDER NOTE - PATIENT PORTAL LINK FT
You can access the FollowMyHealth Patient Portal offered by [facility] by registering at the following website: [email or web address]. By joining [employer]’s FollowMyHealth portal, you will also be able to view your health information using other applications (apps) compatible with our system.

## 2019-12-17 NOTE — ED PROVIDER NOTE - CARE PROVIDER_API CALL
Mian Coronado)  Urology  [address] [place] [address]  Phone: [phone]  Fax: [phone]  Follow Up Time:

## 2019-12-17 NOTE — ED PROVIDER NOTE - CHPI ED SYMPTOMS NEG
no diarrhea/no fever/no chest pain, no SOB, no palpitations, no cough, no headache/no dysuria/no hematuria

## 2019-12-18 LAB
CULTURE RESULTS: SIGNIFICANT CHANGE UP
SPECIMEN SOURCE: SIGNIFICANT CHANGE UP

## 2019-12-23 DIAGNOSIS — N23 UNSPECIFIED RENAL COLIC: ICD-10-CM

## 2019-12-23 DIAGNOSIS — Z79.899 OTHER LONG TERM (CURRENT) DRUG THERAPY: ICD-10-CM

## 2019-12-23 DIAGNOSIS — I10 ESSENTIAL (PRIMARY) HYPERTENSION: ICD-10-CM

## 2019-12-23 DIAGNOSIS — E78.00 PURE HYPERCHOLESTEROLEMIA, UNSPECIFIED: ICD-10-CM

## 2019-12-23 DIAGNOSIS — E11.9 TYPE 2 DIABETES MELLITUS WITHOUT COMPLICATIONS: ICD-10-CM

## 2019-12-23 DIAGNOSIS — Z79.84 LONG TERM (CURRENT) USE OF ORAL HYPOGLYCEMIC DRUGS: ICD-10-CM

## 2019-12-23 DIAGNOSIS — Z88.0 ALLERGY STATUS TO PENICILLIN: ICD-10-CM

## 2019-12-23 DIAGNOSIS — R10.9 UNSPECIFIED ABDOMINAL PAIN: ICD-10-CM

## 2020-11-24 NOTE — PATIENT PROFILE ADULT. - MENTAL HEALTH CONDITIONS/SYMPTOMS, PROFILE
Impression: Type 2 diabetes mellitus w/o complication: W20.9. Plan: Diabetes w/o Complications: No signs of diabetic retinopathy noted. No treatment necessary at this time. Patient was instructed to monitor vision for sudden changes and to call if visual changes noted. Discussed ocular and systemic benefits of blood sugar control. Continue management with PCP. Letter sent to PCP regarding status of ocular health. none

## 2020-12-01 PROBLEM — Z00.00 ENCOUNTER FOR PREVENTIVE HEALTH EXAMINATION: Status: ACTIVE | Noted: 2020-12-01

## 2020-12-07 DIAGNOSIS — Z78.9 OTHER SPECIFIED HEALTH STATUS: ICD-10-CM

## 2020-12-07 DIAGNOSIS — R39.9 UNSPECIFIED SYMPTOMS AND SIGNS INVOLVING THE GENITOURINARY SYSTEM: ICD-10-CM

## 2020-12-07 DIAGNOSIS — E78.5 HYPERLIPIDEMIA, UNSPECIFIED: ICD-10-CM

## 2020-12-07 DIAGNOSIS — N20.9 URINARY CALCULUS, UNSPECIFIED: ICD-10-CM

## 2020-12-07 DIAGNOSIS — I10 ESSENTIAL (PRIMARY) HYPERTENSION: ICD-10-CM

## 2020-12-07 DIAGNOSIS — Z82.3 FAMILY HISTORY OF STROKE: ICD-10-CM

## 2020-12-07 DIAGNOSIS — N52.8 OTHER MALE ERECTILE DYSFUNCTION: ICD-10-CM

## 2020-12-07 DIAGNOSIS — N23 UNSPECIFIED RENAL COLIC: ICD-10-CM

## 2020-12-07 DIAGNOSIS — E11.9 TYPE 2 DIABETES MELLITUS W/OUT COMPLICATIONS: ICD-10-CM

## 2020-12-07 DIAGNOSIS — Z82.49 FAMILY HISTORY OF ISCHEMIC HEART DISEASE AND OTHER DISEASES OF THE CIRCULATORY SYSTEM: ICD-10-CM

## 2020-12-07 PROBLEM — G47.00 INSOMNIA, UNSPECIFIED: Chronic | Status: ACTIVE | Noted: 2019-12-19

## 2020-12-07 RX ORDER — AMLODIPINE BESYLATE 5 MG/1
TABLET ORAL
Refills: 0 | Status: ACTIVE | COMMUNITY

## 2020-12-07 RX ORDER — TAMSULOSIN HCL 0.4 MG
0.4 CAPSULE ORAL
Refills: 0 | Status: ACTIVE | COMMUNITY

## 2020-12-07 RX ORDER — METFORMIN HYDROCHLORIDE 625 MG/1
TABLET ORAL
Refills: 0 | Status: ACTIVE | COMMUNITY

## 2020-12-07 RX ORDER — ZOLPIDEM TARTRATE 5 MG/1
TABLET, FILM COATED ORAL
Refills: 0 | Status: ACTIVE | COMMUNITY

## 2020-12-07 RX ORDER — ATORVASTATIN CALCIUM 80 MG/1
TABLET, FILM COATED ORAL
Refills: 0 | Status: ACTIVE | COMMUNITY

## 2020-12-08 NOTE — REVIEW OF SYSTEMS
[see HPI] : see HPI [Negative] : Heme/Lymph [Recent Weight Gain (___ Lbs)] : recent [unfilled] ~Ulb weight gain [Heartburn] : heartburn [Poor quality erections] : Poor quality erections [No erections] : no erections [Itching] : itching

## 2020-12-10 ENCOUNTER — RESULT CHARGE (OUTPATIENT)
Age: 80
End: 2020-12-10

## 2020-12-10 ENCOUNTER — APPOINTMENT (OUTPATIENT)
Dept: UROLOGY | Facility: CLINIC | Age: 80
End: 2020-12-10
Payer: MEDICARE

## 2020-12-10 DIAGNOSIS — R97.20 ELEVATED PROSTATE, SPECIFIC ANTIGEN [PSA]: ICD-10-CM

## 2020-12-10 LAB
BILIRUB UR QL STRIP: NORMAL
CLARITY UR: CLEAR
COLLECTION METHOD: NORMAL
GLUCOSE UR-MCNC: NORMAL
HCG UR QL: 0.2 EU/DL
HGB UR QL STRIP.AUTO: NORMAL
KETONES UR-MCNC: NORMAL
LEUKOCYTE ESTERASE UR QL STRIP: NORMAL
NITRITE UR QL STRIP: NORMAL
PH UR STRIP: 6
PROT UR STRIP-MCNC: NORMAL
SP GR UR STRIP: 1.01

## 2020-12-10 PROCEDURE — 76857 US EXAM PELVIC LIMITED: CPT

## 2020-12-10 PROCEDURE — 99215 OFFICE O/P EST HI 40 MIN: CPT | Mod: 25

## 2020-12-10 NOTE — ADDENDUM
[FreeTextEntry1] : A portion of this note was written by [Lamonte Jaime] on 12/07/2020 acting as a scribe for Dr. Coronado. \par \par I have personally reviewed the chart and agree that the record accurately reflects my personal performance of the history, physical exam, assessment, and plan.

## 2020-12-10 NOTE — PHYSICAL EXAM
[General Appearance - Well Developed] : well developed [General Appearance - Well Nourished] : well nourished [Normal Appearance] : normal appearance [Well Groomed] : well groomed [General Appearance - In No Acute Distress] : no acute distress [Abdomen Soft] : soft [Abdomen Tenderness] : non-tender [Costovertebral Angle Tenderness] : no ~M costovertebral angle tenderness [Urethral Meatus] : meatus normal [Urinary Bladder Findings] : the bladder was normal on palpation [Scrotum] : the scrotum was normal [Testes Mass (___cm)] : there were no testicular masses [No Prostate Nodules] : no prostate nodules [Edema] : no peripheral edema [] : no respiratory distress [Respiration, Rhythm And Depth] : normal respiratory rhythm and effort [Exaggerated Use Of Accessory Muscles For Inspiration] : no accessory muscle use [Oriented To Time, Place, And Person] : oriented to person, place, and time [Affect] : the affect was normal [Mood] : the mood was normal [Not Anxious] : not anxious [Normal Station and Gait] : the gait and station were normal for the patient's age [No Focal Deficits] : no focal deficits [No Palpable Adenopathy] : no palpable adenopathy [Abdomen Mass (___ Cm)] : no abdominal mass palpated [Abdomen Hernia] : no hernia was discovered [Penis Abnormality] : normal circumcised penis [Epididymis] : the epididymides were normal [Testes Tenderness] : no tenderness of the testes [Prostate Tenderness] : the prostate was not tender [Skin Color & Pigmentation] : normal skin color and pigmentation [Heart Rate And Rhythm] : Heart rate and rhythm were normal

## 2020-12-10 NOTE — HISTORY OF PRESENT ILLNESS
[FreeTextEntry1] : Mr. CARMELA STONER comes in today his urologic follow up. He was found to have a PSA elevation on two recent tests without a change in his voiding symptoms.\par \par From his general urologic history, Mr. Stoner presents with moderate lower urinary tract symptoms (obstructive and irritative), with nocturia x 1. He is continuing on Flomax 0.4 mg. \par IPSS: 6/35\par Sono:  13cc PVR: 65cc prostate\par \par Mr. Stoner has erectile dysfunction, which he has treated with Cialis 1/2 20 mg prn. \par \par PSA: 12/3/20--6.9; 11/20--7.7; 11/14/17--2.7; \par \par CT Abd/Pel: 12/8/20--No mets. Multiple nonobstructive renal calculi. Small bladder calculus. Enlarged prostate; 9/3/17--5 mm right UVJ stone and mild hydronephrosis. Multiple additional stones bilaterally. Largest is 5 mm in the left lower pole. \par \par Renal Sono: 5/23/18--Bilateral stones. Right: 3+4+3mm, Left: 4+4+6mm. \par \par KUB: 5/23/18--Right: 2 + 4mm. Left: 6mm.

## 2020-12-10 NOTE — ASSESSMENT
[FreeTextEntry1] : I discussed the findings and options with Mr. and . CARMELA SAAB in detail.  In view of the abnormal PSA velocity (confirmed by 2 tests) I recommended that he proceed with a multiparametric prostate MRI followed by a transperineal prostate biopsy (likely using fusion technology).  However I will call him with the MRI report.  If it is absolutely normal he may decide to forego a biopsy and repeat a PSA in 4 to 6 weeks understanding the limitations of this approach.\par

## 2020-12-10 NOTE — LETTER BODY
[Dear  ___] : Dear  [unfilled], [Consult Letter:] : I had the pleasure of evaluating your patient, [unfilled]. [Please see my note below.] : Please see my note below. [Consult Closing:] : Thank you very much for allowing me to participate in the care of this patient.  If you have any questions, please do not hesitate to contact me. [Sincerely,] : Sincerely, [FreeTextEntry3] : Mian Coronado MD, FACS